# Patient Record
Sex: MALE | Race: WHITE | Employment: FULL TIME | URBAN - METROPOLITAN AREA
[De-identification: names, ages, dates, MRNs, and addresses within clinical notes are randomized per-mention and may not be internally consistent; named-entity substitution may affect disease eponyms.]

---

## 2023-03-10 ENCOUNTER — HOSPITAL ENCOUNTER (EMERGENCY)
Facility: HOSPITAL | Age: 43
Discharge: LEFT BEFORE DISCHARGE | End: 2023-03-10
Payer: COMMERCIAL

## 2023-03-10 ENCOUNTER — APPOINTMENT (OUTPATIENT)
Dept: GENERAL RADIOLOGY | Facility: HOSPITAL | Age: 43
End: 2023-03-10
Payer: COMMERCIAL

## 2023-03-10 VITALS
TEMPERATURE: 98 F | BODY MASS INDEX: 25.18 KG/M2 | HEART RATE: 72 BPM | WEIGHT: 170 LBS | HEIGHT: 69 IN | OXYGEN SATURATION: 98 % | RESPIRATION RATE: 18 BRPM | DIASTOLIC BLOOD PRESSURE: 82 MMHG | SYSTOLIC BLOOD PRESSURE: 142 MMHG

## 2023-03-10 LAB
ALBUMIN SERPL-MCNC: 4.3 G/DL (ref 3.4–5)
ALBUMIN/GLOB SERPL: 1.3 {RATIO} (ref 1–2)
ALP LIVER SERPL-CCNC: 51 U/L
ALT SERPL-CCNC: 51 U/L
ANION GAP SERPL CALC-SCNC: 4 MMOL/L (ref 0–18)
AST SERPL-CCNC: 51 U/L (ref 15–37)
ATRIAL RATE: 69 BPM
BILIRUB SERPL-MCNC: 0.4 MG/DL (ref 0.1–2)
BUN BLD-MCNC: 15 MG/DL (ref 7–18)
CALCIUM BLD-MCNC: 9.6 MG/DL (ref 8.5–10.1)
CHLORIDE SERPL-SCNC: 107 MMOL/L (ref 98–112)
CO2 SERPL-SCNC: 25 MMOL/L (ref 21–32)
CREAT BLD-MCNC: 1.05 MG/DL
GFR SERPLBLD BASED ON 1.73 SQ M-ARVRAT: 92 ML/MIN/1.73M2 (ref 60–?)
GLOBULIN PLAS-MCNC: 3.3 G/DL (ref 2.8–4.4)
GLUCOSE BLD-MCNC: 85 MG/DL (ref 70–99)
OSMOLALITY SERPL CALC.SUM OF ELEC: 282 MOSM/KG (ref 275–295)
P AXIS: -1 DEGREES
P-R INTERVAL: 112 MS
POTASSIUM SERPL-SCNC: 4.6 MMOL/L (ref 3.5–5.1)
PROT SERPL-MCNC: 7.6 G/DL (ref 6.4–8.2)
Q-T INTERVAL: 394 MS
QRS DURATION: 92 MS
QTC CALCULATION (BEZET): 422 MS
R AXIS: 4 DEGREES
SODIUM SERPL-SCNC: 136 MMOL/L (ref 136–145)
T AXIS: 3 DEGREES
TROPONIN I HIGH SENSITIVITY: 5 NG/L
VENTRICULAR RATE: 69 BPM

## 2023-03-10 PROCEDURE — 71045 X-RAY EXAM CHEST 1 VIEW: CPT

## 2023-03-10 PROCEDURE — 84484 ASSAY OF TROPONIN QUANT: CPT

## 2023-03-10 PROCEDURE — 85025 COMPLETE CBC W/AUTO DIFF WBC: CPT

## 2023-03-10 PROCEDURE — 93005 ELECTROCARDIOGRAM TRACING: CPT

## 2023-03-10 PROCEDURE — 80053 COMPREHEN METABOLIC PANEL: CPT

## 2023-03-10 NOTE — ED INITIAL ASSESSMENT (HPI)
Patient presents to the ED with c/o CP for the last 10 minutes. Denies hx of past cardiac issues. Patient states that he has worn a holter for flutter/palpitations in the past, but nothing abnormal was seen.

## 2025-03-19 NOTE — ED PROVIDER NOTES
Patient Seen in: [facility] Emergency Department      History     Chief Complaint   Patient presents with    Allergic Rxn Allergies     Stated Complaint: allergic reaction    Subjective:   HPI      The patient is a 44-year-old male presents emergency room with history of a rash began around 2:00 this afternoon and became worse this evening.  Patient states he has no new exposures he has at home including soaps, detergents, or foods.  Patient took some liquid Benadryl at home prior to coming in to the ER.  The patient denies history of any new medication he is taking at this time.  The patient denies history of any new foods he is ingested.  Patient denies history of any other somatic complaints or discomfort at this time.    Objective:     Past Medical History:    Hyperlipidemia    Hypothyroid              History reviewed. No pertinent surgical history.             Social History     Socioeconomic History    Marital status:    Tobacco Use    Smoking status: Never    Smokeless tobacco: Never   Vaping Use    Vaping status: Never Used   Substance and Sexual Activity    Alcohol use: Never    Drug use: Never                  Physical Exam     ED Triage Vitals [03/18/25 2207]   /68   Pulse 85   Resp 18   Temp 98.3 °F (36.8 °C)   Temp src Oral   SpO2 96 %   O2 Device None (Room air)       Current Vitals:   Vital Signs  BP: 113/71  Pulse: 64  Resp: 15  Temp: 98.3 °F (36.8 °C)  Temp src: Oral  MAP (mmHg): 83    Oxygen Therapy  SpO2: 100 %  O2 Device: None (Room air)        Physical Exam  GENERAL: Well-developed, well-nourished male sitting up breathing easily in no apparent distress.  Patient is nontoxic in appearance.  HEENT: Head is normocephalic, atraumatic. Pupils are 4 mm equally round and reactive to light. Oropharynx is clear. Mucous membranes are moist.  There is no evidence of any uvular edema appreciated.  There is no lip swelling or tongue swelling appreciated.  NECK: No stridor.  LUNGS: Clear  to auscultation bilaterally with no wheeze. There is good equal air entry bilaterally.  HEART: Regular rate and rhythm. Normal S1, S2 no S3, or S4. No murmur.  EXTREMITIES: There is no cyanosis, clubbing, or edema appreciated. Pulses are 2+ and equal in all 4 extremities.  SKIN: There is a diffuse urticarial rash noted which easily blanches with pressure here in the ER.  NEURO: Patient is awake, alert and oriented to time place and person. Motor strength is 5 over 5 in all 4 extremities. There are no gross motor or sensory deficits appreciated.  Patient answering all questions appropriately.          ED Course   Labs Reviewed - No data to display                MDM          00:47 patient sitting back up breathing easily no apparent distress this time.  Patient with no other new complaints at this time.  Will discharge to home at this time.  Patient's lungs are clear to auscultation on repeat examination at this time.  There is no evidence of any wheeze or stridor.  Patient's rash is improved at this time.        Medical Decision Making  Patient had IV line established had been given IV Benadryl, IV Solu-Medrol, and IV Pepcid here in the ER with improvement in patient's rash after this was given.  Patient sitting back and breathing easily in no apparent distress on initial exam and repeat examination here in the ER.  Patient's lungs were clear to auscultation no evidence of any wheeze or stridor.  The patient has no acute respiratory compromise and appears comfortable on repeat examination and feels comfortable going home at this time.  The patient will be asked to make a list of any and all new exposures at home he is given prescription for prednisone and for Pepcid for home he will take over-the-counter Benadryl for symptoms at home.  The patient has been instructed to return to the ER immediately if symptoms worsen or if any other problems arise.  Patient discharged to home at this time.    Disposition and Plan      Clinical Impression:  1. Allergic reaction, initial encounter         Disposition:  Discharge  3/19/2025 12:48 am    Follow-up:  Violet Sullivan DO  [address] [place] [id number] [phone]    Follow up in 2 day(s)            Medications Prescribed:  Current Discharge Medication List        START taking these medications    Details   predniSONE 20 MG Oral Tab Take 2 tablets (40 mg total) by mouth daily for 3 days.  Qty: 6 tablet, Refills: 0      famotidine (PEPCID) 20 MG Oral Tab Take 1 tablet (20 mg total) by mouth 2 (two) times daily for 3 days.  Qty: 6 tablet, Refills: 0                 Supplementary Documentation:

## 2025-03-19 NOTE — ED INITIAL ASSESSMENT (HPI)
Patient reports rash that began around 2pm today that has become worse as the evening progressed and patient showered. Took 30ml of liquid benadryl. C/o lips swelling but no SOB but Feels tight inside chest

## 2025-03-19 NOTE — DISCHARGE INSTRUCTIONS
Please take benadryl 50 mg every 6 hours for next three days at home,  Please make a list of any new exposures at home  Return to the ER if symptoms worsen or if any other problems arise